# Patient Record
Sex: FEMALE | Race: WHITE | NOT HISPANIC OR LATINO | Employment: FULL TIME | URBAN - METROPOLITAN AREA
[De-identification: names, ages, dates, MRNs, and addresses within clinical notes are randomized per-mention and may not be internally consistent; named-entity substitution may affect disease eponyms.]

---

## 2017-07-28 ENCOUNTER — HOSPITAL ENCOUNTER (OUTPATIENT)
Dept: RADIOLOGY | Facility: MEDICAL CENTER | Age: 52
End: 2017-07-28
Attending: OBSTETRICS & GYNECOLOGY
Payer: COMMERCIAL

## 2017-07-28 DIAGNOSIS — Z12.31 SCREENING MAMMOGRAM, ENCOUNTER FOR: ICD-10-CM

## 2017-07-28 PROCEDURE — G0202 SCR MAMMO BI INCL CAD: HCPCS

## 2018-08-14 ENCOUNTER — HOSPITAL ENCOUNTER (OUTPATIENT)
Dept: RADIOLOGY | Facility: MEDICAL CENTER | Age: 53
End: 2018-08-14
Attending: OBSTETRICS & GYNECOLOGY
Payer: COMMERCIAL

## 2018-08-14 DIAGNOSIS — Z12.31 BREAST CANCER SCREENING BY MAMMOGRAM: ICD-10-CM

## 2018-08-14 PROCEDURE — 77067 SCR MAMMO BI INCL CAD: CPT

## 2018-08-21 ENCOUNTER — HOSPITAL ENCOUNTER (OUTPATIENT)
Dept: RADIOLOGY | Facility: MEDICAL CENTER | Age: 53
End: 2018-08-21
Attending: OBSTETRICS & GYNECOLOGY
Payer: COMMERCIAL

## 2018-08-21 DIAGNOSIS — R92.8 ABNORMAL MAMMOGRAM: ICD-10-CM

## 2018-08-21 PROCEDURE — 77065 DX MAMMO INCL CAD UNI: CPT | Mod: RT

## 2018-08-21 PROCEDURE — 76642 ULTRASOUND BREAST LIMITED: CPT | Mod: RT

## 2019-08-16 ENCOUNTER — HOSPITAL ENCOUNTER (OUTPATIENT)
Dept: RADIOLOGY | Facility: MEDICAL CENTER | Age: 54
End: 2019-08-16
Attending: OBSTETRICS & GYNECOLOGY
Payer: COMMERCIAL

## 2019-08-16 DIAGNOSIS — Z12.31 SCREENING MAMMOGRAM, ENCOUNTER FOR: ICD-10-CM

## 2019-08-16 PROCEDURE — 77063 BREAST TOMOSYNTHESIS BI: CPT

## 2020-09-02 ENCOUNTER — HOSPITAL ENCOUNTER (OUTPATIENT)
Dept: RADIOLOGY | Facility: MEDICAL CENTER | Age: 55
End: 2020-09-02
Attending: OBSTETRICS & GYNECOLOGY
Payer: COMMERCIAL

## 2020-09-02 DIAGNOSIS — Z12.31 VISIT FOR SCREENING MAMMOGRAM: ICD-10-CM

## 2020-09-02 PROCEDURE — 77067 SCR MAMMO BI INCL CAD: CPT

## 2020-09-10 ENCOUNTER — HOSPITAL ENCOUNTER (OUTPATIENT)
Dept: RADIOLOGY | Facility: MEDICAL CENTER | Age: 55
End: 2020-09-10
Attending: UROLOGY
Payer: COMMERCIAL

## 2020-09-10 ENCOUNTER — HOSPITAL ENCOUNTER (OUTPATIENT)
Facility: MEDICAL CENTER | Age: 55
End: 2020-09-10
Attending: UROLOGY
Payer: COMMERCIAL

## 2020-09-10 DIAGNOSIS — N20.0 KIDNEY STONES: ICD-10-CM

## 2020-09-10 LAB
CALCIUM SERPL-MCNC: 10 MG/DL (ref 8.5–10.5)
PTH-INTACT SERPL-MCNC: 60.7 PG/ML (ref 14–72)

## 2020-09-10 PROCEDURE — 74176 CT ABD & PELVIS W/O CONTRAST: CPT

## 2020-09-10 PROCEDURE — 83970 ASSAY OF PARATHORMONE: CPT

## 2020-09-10 PROCEDURE — 82365 CALCULUS SPECTROSCOPY: CPT

## 2020-09-14 LAB
APPEARANCE STONE: NORMAL
COMPN STONE: NORMAL
NUMBER STONE: 3
SIZE STONE: NORMAL MM
SPECIMEN WT: 2 MG

## 2021-09-17 ENCOUNTER — HOSPITAL ENCOUNTER (OUTPATIENT)
Dept: RADIOLOGY | Facility: MEDICAL CENTER | Age: 56
End: 2021-09-17
Attending: FAMILY MEDICINE
Payer: COMMERCIAL

## 2021-09-17 DIAGNOSIS — Z12.31 VISIT FOR SCREENING MAMMOGRAM: ICD-10-CM

## 2021-09-17 PROCEDURE — 77063 BREAST TOMOSYNTHESIS BI: CPT

## 2022-09-27 ENCOUNTER — HOSPITAL ENCOUNTER (OUTPATIENT)
Dept: RADIOLOGY | Facility: MEDICAL CENTER | Age: 57
End: 2022-09-27
Attending: FAMILY MEDICINE
Payer: COMMERCIAL

## 2022-09-27 DIAGNOSIS — Z12.31 VISIT FOR SCREENING MAMMOGRAM: ICD-10-CM

## 2022-09-27 PROCEDURE — 77063 BREAST TOMOSYNTHESIS BI: CPT

## 2022-10-25 ENCOUNTER — OFFICE VISIT (OUTPATIENT)
Dept: CARDIOLOGY | Facility: MEDICAL CENTER | Age: 57
End: 2022-10-25
Payer: COMMERCIAL

## 2022-10-25 ENCOUNTER — HOSPITAL ENCOUNTER (OUTPATIENT)
Dept: LAB | Facility: MEDICAL CENTER | Age: 57
End: 2022-10-25
Attending: INTERNAL MEDICINE
Payer: COMMERCIAL

## 2022-10-25 VITALS
DIASTOLIC BLOOD PRESSURE: 82 MMHG | WEIGHT: 172 LBS | SYSTOLIC BLOOD PRESSURE: 168 MMHG | HEIGHT: 62 IN | RESPIRATION RATE: 16 BRPM | BODY MASS INDEX: 31.65 KG/M2 | HEART RATE: 46 BPM | OXYGEN SATURATION: 99 %

## 2022-10-25 DIAGNOSIS — Z71.89 COUNSELING ON HEALTH PROMOTION AND DISEASE PREVENTION: ICD-10-CM

## 2022-10-25 DIAGNOSIS — R03.0 ELEVATED BLOOD PRESSURE READING: ICD-10-CM

## 2022-10-25 DIAGNOSIS — R00.1 SINUS BRADYCARDIA: Primary | ICD-10-CM

## 2022-10-25 LAB
CHOLEST SERPL-MCNC: 263 MG/DL (ref 100–199)
EKG IMPRESSION: NORMAL
FASTING STATUS PATIENT QL REPORTED: NORMAL
HDLC SERPL-MCNC: 88 MG/DL
LDLC SERPL CALC-MCNC: 154 MG/DL
TRIGL SERPL-MCNC: 107 MG/DL (ref 0–149)

## 2022-10-25 PROCEDURE — 80061 LIPID PANEL: CPT

## 2022-10-25 PROCEDURE — 93000 ELECTROCARDIOGRAM COMPLETE: CPT | Performed by: INTERNAL MEDICINE

## 2022-10-25 PROCEDURE — 99203 OFFICE O/P NEW LOW 30 MIN: CPT | Performed by: INTERNAL MEDICINE

## 2022-10-25 PROCEDURE — 36415 COLL VENOUS BLD VENIPUNCTURE: CPT

## 2022-10-25 RX ORDER — IXEKIZUMAB 80 MG/ML
80 INJECTION, SOLUTION SUBCUTANEOUS
COMMUNITY
Start: 2022-09-28

## 2022-10-25 RX ORDER — NIRMATRELVIR AND RITONAVIR 300-100 MG
KIT ORAL
COMMUNITY
Start: 2022-09-06 | End: 2022-10-25

## 2022-10-25 RX ORDER — MELOXICAM 15 MG/1
TABLET ORAL
COMMUNITY
Start: 2022-10-10

## 2022-10-25 NOTE — PATIENT INSTRUCTIONS
Lipid profile ordered, blood test    Checking Blood Pressure:  -Blood pressure cuff, spend in the $40-65, with good return policy. [employer] is a good brand  -It should be automatic, upper arm, measure your arm to get the correct size, probably adult Large but your arm should be under 16.5-17 cm. If you need an XL cuff, you will have to have it special ordered from a pharmacy or durable medical equipment company.  -Put the cuff in place, rest arm on table near height of your heart, sit quietly for 5 min, legs uncrossed, with back support, then take your blood pressure, write it down, keep a log  -Check your blood pressure in the morning and evening, and send me your log in 1 week, either through [facility] or by calling our office.  -Can bring your cuff to at least one appointment where it can be calibrated to a manual cuff if you are concerned.  -Goal blood pressure is under 120/80.  If you think your BP is overall too high, let us know in the office, we can adjust medications, can use [facility] or call the [place] office: [phone].    Salt=sodium=sea salt, guidelines say stay under 2,500 mg daily  Get salt smart, start looking at labels, count it up.  Salt is hidden in everything, salad dressing, sauces, cheese, most canned food, any processed meat.

## 2022-10-25 NOTE — PROGRESS NOTES
CARDIOLOGY NEW PATIENT:    PCP: Lorena Lopez M.D.    1. Sinus bradycardia    2. Counseling on health promotion and disease prevention    3. Elevated blood pressure reading        Kiana Roper is here for bradycardia.    Chief Complaint   Patient presents with    Bradycardia         History: Kiana Roper is a 57 y.o. female with psoriatic arthritis, inflammatory polyarthropathy on immunosuppressive medication Taltz q4 weeks, parathyroid adenoma status post removal, nephrolithiasis, carpal tunnel syndrome, osteopenia, history of COVID September 2022 is referred to cardiology due to ECG showing bradycardia on 10/10/2022 -  ECG done at a [facility] clinic (image below).      Reports knowing of slow heart rate forever, and she takes clindamycin pre dental work for a heart murmur, no known congenital heart disease.    3 months ago she gave a blood transfusion, and afterwards she had a possible syncopal episode without much prodrome. Does not recall if they checked her HR or BP then. Never had this before with prior blood donations.    Stays active, desk job, walks stairs and occasional walking. No exercise routine. No limitations with these activities.    Does not sleep well, chronically, denies LH, dizziness, palpitations, SOB, ZULLY, orthopnea, PND, caldication. No Known HAYDEE.    Normal TSH 10/20/2022 at 1.61    No recent lipid panel or A1c available for my review.    No BP machine at home. Willing to check her BP at home. Always noticed high BP at clinic visits.    Pertinent family History:  Mother and brother with slow heart rate    Social:  Coordinator for childcare resources, state funded [employer]  Lives with BF  Has 1 daughter , 1 son  1 grandchild  2 cats  Never smoked  Alcohol: about 5 drinks a week  No marijuana use  No illicit drugs      ECG 10/10/22: Personally reviewed  Sinus bradycardia, heart rate 44 bpm, no concerning ischemic changes.  Normal NE, QRS and Qtc.        PE:  BP (!) 168/82 (BP  "Location: Left arm, Patient Position: Sitting, BP Cuff Size: Adult)   Pulse (!) 46   Resp 16   Ht 1.575 m (5' 2\")   Wt 78 kg (172 lb)   SpO2 99%   BMI 31.46 kg/m²     Gen: well  HEENT: Symmetric face. Anicteric sclerae. Moist mucus membranes  NECK: No JVD. No lymphadenopathy  CARDIAC: Regular, Normal S1, S2, No murmur  VASCULATURE: carotids are normal bilaterally without bruit  RESP: Clear to auscultation bilaterally  ABD: Soft, non-tender, non-distended  EXT: No edema, no clubbing or cyanosis  SKIN: Warm and dry  NEURO: No gross deficits  PSYCH: Appropriate affect, participates in conversation    The ASCVD Risk score (Ariel DK, et al., 2019) failed to calculate.    Past Medical History:   Diagnosis Date    Pain     shoulder pain     Past Surgical History:   Procedure Laterality Date    SHOULDER DECOMPRESSION ARTHROSCOPIC  2012    Performed by Kevin Baker M.D. at SURGERY [facility]    CLAVICLE DISTAL EXCISION  2012    Performed by Kevin Baker M.D. at SURGERY [facility]    MT FRAGMENT KIDNEY STONE/ [facility]      PARATHYROIDECTOMY      tumor removed also    MT  DELIVERY ONLY  ,      Allergies   Allergen Reactions    Amoxicillin      Outpatient Encounter Medications as of 10/25/2022   Medication Sig Dispense Refill    Ixekizumab (TALTZ) 80 MG/ML Solution Auto-injector Inject 80 mg under the skin.      meloxicam (MOBIC) 15 MG tablet take 1 Tablet (15 mg total) by mouth daily with dinner      BIOTIN PO Take  by mouth.      ALBUTEROL INH Inhale.      [DISCONTINUED] PAXLOVID, 300/100, 20 x 150 MG & 10 x 100MG Tablet Therapy Pack take 3 Tablets by mouth 2 times a day for 5 days. (Patient not taking: Reported on 10/25/2022)      [DISCONTINUED] zolpidem (AMBIEN) 10 MG TABS Take 10 mg by mouth at bedtime as needed.         No facility-administered encounter medications on file as of 10/25/2022.     Social History     Socioeconomic History    Marital status: Legally "      Spouse name: Not on file    Number of children: Not on file    Years of education: Not on file    Highest education level: Not on file   Occupational History    Not on file   Tobacco Use    Smoking status: Never    Smokeless tobacco: Never   Substance and Sexual Activity    Alcohol use: Yes     Comment: 5 per week    Drug use: No    Sexual activity: Not on file   Other Topics Concern    Not on file   Social History Narrative    Not on file     Social Determinants of Health     Financial Resource Strain: Not on file   Food Insecurity: Not on file   Transportation Needs: Not on file   Physical Activity: Not on file   Stress: Not on file   Social Connections: Not on file   Intimate Partner Violence: Not on file   Housing Stability: Not on file     Family History   Problem Relation Age of Onset    Arthritis Brother     Diabetes Maternal Grandfather          Studies    No results found for: TSHULTRASEN   No results found for: FREET4   No results found for: HBA1C  No results found for: CHOLSTRLTOT, LDL, HDL, TRIGLYCERIDE    Lab Results   Component Value Date/Time    SODIUM 140 05/01/2020 09:13 AM    SODIUM 140 12/03/2012 02:04 PM    POTASSIUM 4.4 05/01/2020 09:13 AM    POTASSIUM 3.6 12/03/2012 02:04 PM    CHLORIDE 99 05/01/2020 09:13 AM    CHLORIDE 103 12/03/2012 02:04 PM    CO2 26 05/01/2020 09:13 AM    CO2 28 12/03/2012 02:04 PM    GLUCOSE 98 05/01/2020 09:13 AM    GLUCOSE 104 (H) 12/03/2012 02:04 PM    BUN 13 05/01/2020 09:13 AM    BUN 11 12/03/2012 02:04 PM    CREATININE 0.79 05/01/2020 09:13 AM    CREATININE 0.60 12/03/2012 02:04 PM    CREATININE 0.7 08/21/2007 12:33 PM    BUNCREATRAT 16 05/01/2020 09:13 AM     Lab Results   Component Value Date/Time    ALKPHOSPHAT 79 05/01/2020 09:13 AM    ALKPHOSPHAT 59 08/21/2007 12:33 PM    ASTSGOT 22 05/01/2020 09:13 AM    ASTSGOT 21 08/21/2007 12:33 PM    ALTSGPT 25 05/01/2020 09:13 AM    ALTSGPT 20 08/21/2007 12:33 PM    TBILIRUBIN 0.3 05/01/2020 09:13 AM     TBILIRUBIN 0.7 08/21/2007 12:33 PM        Echocardiogram:  No results found for this or any previous visit.    ECG: 10/25/22 personally reviewed and interpreted  Interpretive Statements   Sinus bradycardia / arrhythmia HR 46bpm   Compared to ECG 12/03/2012 15:10:50   No significant changes   Electronically Signed On 10- 13:16:17 PDT by Erick Rivera MD     Assessment and Recommendations:    Problem List Items Addressed This Visit    None  Visit Diagnoses       Sinus bradycardia    -  Primary    Relevant Orders    EKG (Completed)    Counseling on health promotion and disease prevention        Relevant Orders    Lipid Profile    Elevated blood pressure reading        Relevant Orders    Lipid Profile          Overall it seems that Ms. Roper is doing well from a cardiovascular standpoint with most likely asymptomatic sinus bradycardia.  Her episode of possible syncope after blood transfusion about 3 months ago is probably related to orthostatic hypotension.    I discussed with her the option of placing an implantable loop recorder to ensure no correlation between any potential future syncopal events and any bradycardia arrhythmias, however we agreed to continue with watchful waiting.    In regards to her elevated blood pressure reading today, I gave her instructions on what blood pressure machine to purchase and help to check her blood pressure at home and to send me an a.m. and p.m. blood pressure log in 1 week before we decide on treating hypertension.  Will eventually defer to PCP.    I gave her a lab slip to obtain a fasting lipid panel (she only drank 1 cup of coffee today with no creamer or sugar, she will try to get it done at [facility] today) to finalize recommendations regarding statin therapy for primary ASCVD prevention.    We discussed at length the importance of participating in more aerobic exercise and eating a heart healthy diet including low-salt diet.    In regards to her reported history of cardiac  murmur and taking clindamycin before dental work, on exam I do not hear any murmurs, no known history of congenital heart disease.  No known heart procedures.  I advised her that she does not need endocarditis prophylaxis medication before dental work based on the available history and physical exam.    Thank you for the opportunity to be involved in Kiana Roper 's care; and please reach out with any questions or concerns.    Return if symptoms worsen or fail to improve.    Erick Rivera MD, MPH [facility]  Interventional Cardiologist  [facility]   of Clinical Internal Medicine - [facility]    ~ Portions of this note were completed using voice recognition software (Dragon Naturally speaking software) . Occasional transcription errors may have escaped proof reading. I have made every reasonable attempt to correct obvious errors, but I expect that there are errors of grammar and possibly content that I did not discover before finalizing the note. ~

## 2022-11-02 DIAGNOSIS — I10 ESSENTIAL HYPERTENSION, BENIGN: ICD-10-CM

## 2022-11-02 RX ORDER — LISINOPRIL 5 MG/1
5 TABLET ORAL DAILY
Qty: 90 TABLET | Refills: 3 | Status: SHIPPED | OUTPATIENT
Start: 2022-11-02

## 2023-04-07 ENCOUNTER — APPOINTMENT (OUTPATIENT)
Dept: RADIOLOGY | Facility: MEDICAL CENTER | Age: 58
End: 2023-04-07
Attending: INTERNAL MEDICINE
Payer: COMMERCIAL

## 2023-04-17 ENCOUNTER — APPOINTMENT (OUTPATIENT)
Dept: RADIOLOGY | Facility: MEDICAL CENTER | Age: 58
End: 2023-04-17
Attending: INTERNAL MEDICINE
Payer: COMMERCIAL

## 2023-04-17 DIAGNOSIS — M79.641 RIGHT HAND PAIN: ICD-10-CM

## 2023-04-17 DIAGNOSIS — M79.642 LEFT HAND PAIN: ICD-10-CM

## 2023-04-17 DIAGNOSIS — M54.12 CERVICAL RADICULOPATHY: ICD-10-CM

## 2023-04-17 PROCEDURE — 73221 MRI JOINT UPR EXTREM W/O DYE: CPT | Mod: RT

## 2023-04-17 PROCEDURE — 72141 MRI NECK SPINE W/O DYE: CPT

## 2023-04-17 PROCEDURE — 73218 MRI UPPER EXTREMITY W/O DYE: CPT | Mod: RT

## 2023-04-18 ENCOUNTER — OFFICE VISIT (OUTPATIENT)
Dept: PHYSICAL MEDICINE AND REHAB | Facility: MEDICAL CENTER | Age: 58
End: 2023-04-18
Payer: COMMERCIAL

## 2023-04-18 VITALS
SYSTOLIC BLOOD PRESSURE: 148 MMHG | HEART RATE: 54 BPM | HEIGHT: 62 IN | TEMPERATURE: 97.1 F | OXYGEN SATURATION: 99 % | WEIGHT: 171.52 LBS | DIASTOLIC BLOOD PRESSURE: 92 MMHG | BODY MASS INDEX: 31.56 KG/M2

## 2023-04-18 DIAGNOSIS — Z13.31 POSITIVE DEPRESSION SCREENING: ICD-10-CM

## 2023-04-18 DIAGNOSIS — R20.0 NUMBNESS AND TINGLING OF RIGHT ARM: ICD-10-CM

## 2023-04-18 DIAGNOSIS — Z91.81 RISK FOR FALLS: ICD-10-CM

## 2023-04-18 DIAGNOSIS — M47.816 LUMBAR SPONDYLOSIS: ICD-10-CM

## 2023-04-18 DIAGNOSIS — R20.2 NUMBNESS AND TINGLING OF RIGHT ARM: ICD-10-CM

## 2023-04-18 DIAGNOSIS — R20.0 NUMBNESS AND TINGLING IN LEFT ARM: ICD-10-CM

## 2023-04-18 DIAGNOSIS — G89.29 CHRONIC BILATERAL LOW BACK PAIN WITHOUT SCIATICA: ICD-10-CM

## 2023-04-18 DIAGNOSIS — R20.2 NUMBNESS AND TINGLING IN LEFT ARM: ICD-10-CM

## 2023-04-18 DIAGNOSIS — M79.10 MYALGIA: ICD-10-CM

## 2023-04-18 DIAGNOSIS — M54.2 CERVICALGIA: ICD-10-CM

## 2023-04-18 DIAGNOSIS — M54.50 CHRONIC BILATERAL LOW BACK PAIN WITHOUT SCIATICA: ICD-10-CM

## 2023-04-18 PROCEDURE — 99204 OFFICE O/P NEW MOD 45 MIN: CPT | Performed by: STUDENT IN AN ORGANIZED HEALTH CARE EDUCATION/TRAINING PROGRAM

## 2023-04-18 ASSESSMENT — PATIENT HEALTH QUESTIONNAIRE - PHQ9
SUM OF ALL RESPONSES TO PHQ QUESTIONS 1-9: 11
CLINICAL INTERPRETATION OF PHQ2 SCORE: 2
5. POOR APPETITE OR OVEREATING: 1 - SEVERAL DAYS

## 2023-04-18 ASSESSMENT — PAIN SCALES - GENERAL: PAINLEVEL: 7=MODERATE-SEVERE PAIN

## 2023-04-18 NOTE — PROGRESS NOTES
"New Patient Note    Interventional Pain and Spine  Physiatry (Physical Medicine and Rehabilitation)     Patient Name: Kiana Roper  : 1965  Date of Service: 2023  PCP: Lorena Lopez M.D.  Referring Provider: Ana Luisa Emerson M.D.    Chief Complaint:   Chief Complaint   Patient presents with    New Patient     DJD (degenerative joint disease)       HPI  HISTORY (2023):  Kiana Roper is a 57 y.o. female who presents today with neck pain and back pain. She was referred by [facility] for cervical DJD with no improvement after PT and NSAIDs. Sees rheum for psoriatic arthritis. She had to discontinue her latest rheum med due to side effect of rash and is concerned that her pain will worsen in the meantime.     Regarding neck pain: She notes 1 year history of pain that feels like someone is \"pushing down on her shoulders\" and her entire arms go numb and tingly. This worsens with overexertion and improves with resting her head backwards on something.  Worsening, getting more frequent. Does PT and yoga and home exercise program. Notes remote hx of carpal tunnel syndrome on her right and is now s/p carpal tunnel release. Also was diagnosed with mild left carpal tunnel syndrome which has improved. Notes general weakness but denies focal weakness.    Regarding low back pain: back pain that feels like \"chronic kidney stones\" and internal tension. Notes her urine has been checked and was WNL.  Pain improves with placing pressure on her back and using a heating pad. Notes occasional muscle spasms in her back.  Notes history of leg tremor.    Pain right now is 7/10 on the numeric pain scale. Her pain at its best-worse level during the course of the day is typically 5-7/10, respectively.  Pain worsens with sitting, standing, walking, bending forward, bending backwards, side bending or twisting, walking upstairs, walking downstairs, coughing, and sneezing and improves with laying down. " Her pain interferes somewhat with ADLs.     The patient has done physical therapy for her neck pain but not back pain.    Patient has tried the following medications with varied success:  Mobic  Aleve    Therapeutic modalities and interventional therapies to date include:  -no injections    Medical history includes anxiety, CHF, psoriatic arthritis followed by [place] [facility]      Psychological testing for pain as depression and pain commonly coexist and need to both be treated.     Opioid Risk Score: 0      Interpretation of Opioid Risk Score   Score 0-3 = Low risk of abuse. Do UDS at least once per year.  Score 4-7 = Moderate risk of abuse. Do UDS 1-4 times per year.  Score 8+ = High risk of abuse. Refer to specialist.    PHQ      2023     3:00 PM   Depression Screen (PHQ-2/PHQ-9)   PHQ-2 Total Score 2   PHQ-9 Total Score 11       Interpretation of PHQ-9 Total Score   Score Severity   1-4 No Depression   5-9 Mild Depression   10-14 Moderate Depression   15-19 Moderately Severe Depression   20-27 Severe Depression      Medical records review:  I reviewed the note from the referring provider Ana Luisa Emerson M.D. including the note dated 3/23/23.    ROS:   Red Flags ROS:   Fever, Chills, Sweats: Denies  Involuntary Weight Loss: Denies  Bladder Incontinence: Denies  Bowel Incontinence: denies  Saddle Anesthesia: Denies    All other systems reviewed and negative.     PMHx:   Past Medical History:   Diagnosis Date    Pain     shoulder pain       PSHx:   Past Surgical History:   Procedure Laterality Date    SHOULDER DECOMPRESSION ARTHROSCOPIC  2012    Performed by Kevin Baker M.D. at [facility]S    CLAVICLE DISTAL EXCISION  2012    Performed by Kevin Baker M.D. at [facility] OR[facility]    [place] FRAGMENT KIDNEY STONE/ ESWL  2007    PARATHYROIDECTOMY  2007    tumor removed also    [place]  DELIVERY ONLY  ,        Family Hx:   Family History   Problem Relation Age of  Onset    Arthritis Brother     Diabetes Maternal Grandfather        Social Hx:  Social History     Socioeconomic History    Marital status: Legally      Spouse name: Not on file    Number of children: Not on file    Years of education: Not on file    Highest education level: Not on file   Occupational History    Not on file   Tobacco Use    Smoking status: Never    Smokeless tobacco: Never   Substance and Sexual Activity    Alcohol use: Yes     Comment: 5 per week    Drug use: No    Sexual activity: Not on file   Other Topics Concern    Not on file   Social History Narrative    Not on file     Social Determinants of Health     Financial Resource Strain: Not on file   Food Insecurity: Not on file   Transportation Needs: Not on file   Physical Activity: Not on file   Stress: Not on file   Social Connections: Not on file   Intimate Partner Violence: Not on file   Housing Stability: Not on file       Allergies:  Allergies   Allergen Reactions    Amoxicillin        Medications: reviewed on epic.   Outpatient Medications Marked as Taking for the 4/18/23 encounter (Office Visit) with Cami Harkins M.D.   Medication Sig Dispense Refill    sertraline (ZOLOFT) 50 MG Tab Take 50 mg by mouth every day.      BIOTIN PO Take  by mouth.      ALBUTEROL INH Inhale.          Current Outpatient Medications on File Prior to Visit   Medication Sig Dispense Refill    sertraline (ZOLOFT) 50 MG Tab Take 50 mg by mouth every day.      BIOTIN PO Take  by mouth.      ALBUTEROL INH Inhale.      lisinopril (PRINIVIL) 5 MG Tab Take 1 Tablet by mouth every day. (Patient not taking: Reported on 4/18/2023) 90 Tablet 3    Ixekizumab (TALTZ) 80 MG/ML Solution Auto-injector Inject 80 mg under the skin. (Patient not taking: Reported on 4/18/2023)      meloxicam (MOBIC) 15 MG tablet take 1 Tablet (15 mg total) by mouth daily with dinner (Patient not taking: Reported on 4/18/2023)       No current facility-administered medications on file prior  "to visit.         EXAMINATION     Physical Exam:   BP (!) 148/92 (BP Location: Right arm, Patient Position: Sitting, BP Cuff Size: Adult)   Pulse (!) 54   Temp 36.2 °C (97.1 °F) (Temporal)   Ht 1.575 m (5' 2\")   Wt 77.8 kg (171 lb 8.3 oz)   SpO2 99%     Constitutional:   Body Habitus: Body mass index is 31.37 kg/m².  Cooperation: Fully cooperates with exam  Appearance: Well-groomed, well-nourished.    Eyes: No scleral icterus to suggest severe liver disease, no proptosis to suggest severe hyperthyroidism    ENT -no obvious auditory deficits, no noticeable facial droop     Skin -no rashes or lesions noted     Respiratory-  breathing comfortably on room air, no audible wheezing    Cardiovascular-distal extremities warm and well perfused.  No lower extremity edema is noted.     Gastrointestinal - no obvious abdominal masses, non-distended    Psychiatric- alert and oriented ×3. Normal affect.     Gait - normal gait, no use of ambulatory device, nonantalgic. Heel walking and toe walking intact.    Musculoskeletal and Neuro -     Cervical spine   Inspection: No deformities of the skin over the cervical spine. No rashes or lesions.    full active range of motion in all directions    Spurling's sign  negative bilaterally  Cervical facet loading maneuver  negative bilaterally    No signs of muscular atrophy in bilateral upper extremities     Tenderness to palpation at upper trapezius bilaterally. No tenderness to palpation elsewhere including midline of cervical spine, paracervical muscles bilaterally, and cervical facets bilaterally.      Key points for the international standards for neurological classification of spinal cord injury (ISNCSCI) to light touch.     Dermatome R L   C4 2 2   C5 2 2   C6 2 2   C7 2 2   C8 2 2   T1 2 2   T2 2 2       Motor Exam Upper Extremities   ? Myotome R L   Shoulder abduction C5 5 5   Elbow flexion C5 5 5   Wrist extension C6 5 5   Elbow extension C7 5 5   Finger flexion C8 5 5 "   Finger abduction T1 5 5     Reflexes  ?  R L   Biceps  3+ 3+   Brachioradialis  3+ 3+     Novoa's sign positive bilaterally -symmetric       Thoracic/Lumbar Spine/Sacral Spine/Hips   Inspection: No evidence of atrophy in bilateral lower extremities throughout     There is full active range of motion with lumbar extension    Facet loading maneuver positive bilaterally    Palpation:   Tenderness to palpation over the lumbar facets bilaterally spanning approximately L1-L5 levels.    Lumbar spine /hip provocative exam maneuvers  Straight leg raise negative bilaterally  FADIR test negative bilaterally    SI joint tests  SPIKE test negative bilaterally  Thigh thrust test negative bilaterally    Key points for the international standards for neurological classification of spinal cord injury (ISNCSCI) to light touch.   Dermatome R L   L2 2 2   L3 2 2   L4 2 2   L5 2 2   S1 2 2   S2 2 2       Motor Exam Lower Extremities  ? Myotome R L   Hip flexion L2 5 5   Knee extension L3 5 5   Ankle dorsiflexion L4 5 5   Toe extension L5 5 5   Ankle plantarflexion S1 5 5       Reflexes  ?  R L   Patella  3+ 3+   Achilles   3+ 3+     Clonus of the ankle negative bilaterally       MEDICAL DECISION MAKING    Medical records review: see under HPI section.     DATA    Labs: Personally reviewed at today's visit:     Lab Results   Component Value Date/Time    SODIUM 140 05/01/2020 09:13 AM    SODIUM 140 12/03/2012 02:04 PM    POTASSIUM 4.4 05/01/2020 09:13 AM    POTASSIUM 3.6 12/03/2012 02:04 PM    CHLORIDE 99 05/01/2020 09:13 AM    CHLORIDE 103 12/03/2012 02:04 PM    CO2 26 05/01/2020 09:13 AM    CO2 28 12/03/2012 02:04 PM    ANION 9.0 12/03/2012 02:04 PM    GLUCOSE 98 05/01/2020 09:13 AM    GLUCOSE 104 (H) 12/03/2012 02:04 PM    BUN 13 05/01/2020 09:13 AM    BUN 11 12/03/2012 02:04 PM    CREATININE 0.79 05/01/2020 09:13 AM    CREATININE 0.60 12/03/2012 02:04 PM    CREATININE 0.7 08/21/2007 12:33 PM    CALCIUM 10.0 09/10/2020 02:50 PM     ASTSGOT 22 05/01/2020 09:13 AM    ASTSGOT 21 08/21/2007 12:33 PM    ALTSGPT 25 05/01/2020 09:13 AM    ALTSGPT 20 08/21/2007 12:33 PM    TBILIRUBIN 0.3 05/01/2020 09:13 AM    TBILIRUBIN 0.7 08/21/2007 12:33 PM    ALBUMIN 4.8 05/01/2020 09:13 AM    ALBUMIN 4.2 08/21/2007 12:33 PM    TOTPROTEIN 8.0 05/01/2020 09:13 AM    TOTPROTEIN 7.3 08/21/2007 12:33 PM    GLOBULIN 3.1 08/21/2007 12:33 PM    AGRATIO 1.5 05/01/2020 09:13 AM    AGRATIO 1.4 08/21/2007 12:33 PM       Lab Results   Component Value Date/Time    PROTHROMBTM 11.6 06/12/2007 05:10 PM    INR 0.92 06/12/2007 05:10 PM        Lab Results   Component Value Date/Time    WBC 9.4 12/03/2012 02:04 PM    RBC 4.66 12/03/2012 02:04 PM    HEMOGLOBIN 14.3 12/03/2012 02:04 PM    HEMATOCRIT 42.1 12/03/2012 02:04 PM    MCV 90.4 12/03/2012 02:04 PM    MCH 30.8 12/03/2012 02:04 PM    MCHC 34.0 12/03/2012 02:04 PM    MPV 7.6 12/03/2012 02:04 PM    NEUTSPOLYS 69.3 08/21/2007 12:33 PM    LYMPHOCYTES 23.0 08/21/2007 12:33 PM    MONOCYTES 5.2 08/21/2007 12:33 PM    EOSINOPHILS 2.1 08/21/2007 12:33 PM    BASOPHILS 0.3 08/21/2007 12:33 PM        No results found for: HBA1C     Imaging:   I personally reviewed following images, these are my reads  MRI cervical spine 4/17/2023  Disc bulge most notable at C4-5 and C5-6 with moderate to severe bilateral neuroforaminal stenosis at each of these levels.  Mild bilateral neuroforaminal stenosis at C6-7.  See formal radiology report for further details.          IMAGING radiology reads. I reviewed the following radiology reads   X-ray lumbar spine 10/10/2022    FINDINGS:  AP, lateral x 2 views demonstrate 5 nonrib-bearing lumbar   vertebral segments with curvature apex to the left at L3. no fracture or   osseous lesion.  Degerative disc disease is as follow, with severity of   disc space narrowing and any associated other findings by level:     T12-L1: mild to moderate   L1-L2: minimal   L2-L3: minimal   L3-L4: minimal   L4-L5: minmal    L5-S1:mild to moderate, mild to moderate facet disease.     SI joints are unremarkable.     IMPRESSION: Degenerative disc disease.      MRI cervical spine 4/17/2023                               FINDINGS:  The cervical spine maintains normal height and alignment. There is no fracture or dislocation. There is no pathologic marrow infiltration. There is no focal osseous lesion.     At the level of C2-3,there is no spinal or neural foraminal stenosis.     At the level of C3-4,there is no spinal or neural foraminal stenosis.     At the level of C4-5,there is disc degeneration. There is mild diffuse disc bulge. Bilateral uncinate joint arthropathy is seen. There is mild effacement of the ventral subarachnoid space. There is moderate to severe bilateral neural foraminal stenosis.     At the level of C5-6,there is disc degeneration. There is mild diffuse disc bulge. There is mild effacement of the ventral subarachnoid space. Bilateral uncinate joint arthropathy is seen. There is moderate to severe bilateral neural foraminal stenosis.     At the level of C6-7,there is disc degeneration. There is small central disc protrusion. There is focal effacement of the ventral subarachnoid space. There is mild bilateral neural foraminal stenosis.     At the level of C7-T1,there is no spinal or neural foraminal stenosis.     The visualized posterior fossa structures appear normal within limits.  The cervical spinal cord does not demonstrate any mass or abnormal T2 signal intensity.     The visualized pre-and paraspinal soft tissues appear normal within limits.     IMPRESSION:     Multifocal degenerative disease in the cervical spine as described above.    Diagnosis  Visit Diagnoses     ICD-10-CM   1. Numbness and tingling in left arm  R20.0    R20.2   2. Numbness and tingling of right arm  R20.0    R20.2   3. Cervicalgia  M54.2   4. Myalgia  M79.10   5. Chronic bilateral low back pain without sciatica  M54.50    G89.29   6. Lumbar  spondylosis  M47.816   7. Positive depression screening  Z13.31   8. Risk for falls  Z91.81         ASSESSMENT AND PLAN:  Kiana Roper ( 1965) is a female presenting with pain primarily at her bilateral upper trapezius with negative Spurling's and negative facet loading maneuver.  She endorses circumferential numbness and tingling in her bilateral arms that improves with cervical extension which does not sound consistent with the areas of cervical neuroforaminal stenosis seen on her MRI cervical spine 2023.  At this time it is unclear what the primary reason for her numbness and tingling in her arms are.  She does endorse history of bilateral carpal tunnel syndrome, with carpal tunnel release on the right side that improved her symptoms at that time.  Notes that her current symptoms feel different from carpal tunnel syndrome.    Also with bilateral low back pain axial non radiating for which she has not done physical therapy in the past.       Kiana was seen today for new patient.    Diagnoses and all orders for this visit:    Numbness and tingling in left arm  -     Referral to Neurodiagnostics (EEG,EP,EMG/NCS/DBS)    Numbness and tingling of right arm  -     Referral to Neurodiagnostics (EEG,EP,EMG/NCS/DBS)    Cervicalgia    Myalgia    Chronic bilateral low back pain without sciatica  -     Referral to Physical Therapy    Lumbar spondylosis  -     Referral to Physical Therapy    Positive depression screening    Risk for falls  -     Patient identified as fall risk.  Appropriate orders and counseling given.          PLAN  Physical Therapy: I ordered physical therapy to focus on strengthening and stretching as well as a home exercise program.  Advised her to continue her home exercise program for her upper back    Diagnostic workup: Personally reviewed at today's visit: MRI cervical spine 2023        Medications:   -Okay to continue Mobic or Aleve as needed    Interventions:   - Trigger  point injections under ultrasound guidance. The risks, benefits, and alternatives to this procedure were discussed. Risks include but are not limited to damage to surrounding structures, infection, bleeding, worsening of pain which can be permanent, and collapsed lung. Benefits include pain relief and improved function. Alternatives include not doing the procedure.  The patient wishes to avoid this procedure for now but will contact me if she elects to proceed with this in the future    Referral:  - To neurodiagnostics for EMG    Follow-up: After EMG    Orders Placed This Encounter    Referral to Physical Therapy    Referral to Neurodiagnostics (EEG,EP,EMG/NCS/DBS)    Patient identified as fall risk.  Appropriate orders and counseling given.    sertraline (ZOLOFT) 50 MG Tab       Cami Harkins MD  Interventional Pain and Spine  Physical Medicine and Rehabilitation  [facility]    CC Ana Luisa Emerson M.D.     The above note documents my personal evaluation of this patient. In addition, I have reviewed and confirmed with the patient and MA the supportive information documented in today's Patient Health Questionnaire and Office Note.     Please note that this dictation was created using voice recognition software. I have made every reasonable attempt to correct obvious errors, but I expect that there are errors of grammar and possibly content that I did not discover before finalizing the note.

## 2023-05-10 ENCOUNTER — PATIENT MESSAGE (OUTPATIENT)
Dept: PHYSICAL MEDICINE AND REHAB | Facility: MEDICAL CENTER | Age: 58
End: 2023-05-10
Payer: COMMERCIAL

## 2023-05-10 NOTE — TELEPHONE ENCOUNTER
Based on Loreto's note on the referral request, I think radiology might still be processing the referral. Please doublecheck the status of the referral request in her chart and let her know how to proceed with scheduling it.

## 2023-05-19 ENCOUNTER — NON-PROVIDER VISIT (OUTPATIENT)
Dept: NEUROLOGY | Facility: MEDICAL CENTER | Age: 58
End: 2023-05-19
Attending: SPECIALIST
Payer: COMMERCIAL

## 2023-05-19 DIAGNOSIS — R20.0 NUMBNESS AND TINGLING OF RIGHT ARM: ICD-10-CM

## 2023-05-19 DIAGNOSIS — R20.2 NUMBNESS AND TINGLING OF RIGHT ARM: ICD-10-CM

## 2023-05-19 DIAGNOSIS — R20.2 NUMBNESS AND TINGLING IN LEFT ARM: ICD-10-CM

## 2023-05-19 DIAGNOSIS — R20.0 NUMBNESS AND TINGLING IN LEFT ARM: ICD-10-CM

## 2023-05-19 PROCEDURE — 95910 NRV CNDJ TEST 7-8 STUDIES: CPT | Performed by: SPECIALIST

## 2023-05-19 PROCEDURE — 95886 MUSC TEST DONE W/N TEST COMP: CPT | Mod: 26 | Performed by: SPECIALIST

## 2023-05-19 PROCEDURE — 95910 NRV CNDJ TEST 7-8 STUDIES: CPT | Mod: 26 | Performed by: SPECIALIST

## 2023-05-19 PROCEDURE — 95886 MUSC TEST DONE W/N TEST COMP: CPT | Performed by: SPECIALIST

## 2023-05-24 ENCOUNTER — OFFICE VISIT (OUTPATIENT)
Dept: PHYSICAL MEDICINE AND REHAB | Facility: MEDICAL CENTER | Age: 58
End: 2023-05-24
Payer: COMMERCIAL

## 2023-05-24 VITALS
HEART RATE: 46 BPM | WEIGHT: 171.74 LBS | DIASTOLIC BLOOD PRESSURE: 90 MMHG | OXYGEN SATURATION: 97 % | SYSTOLIC BLOOD PRESSURE: 140 MMHG | BODY MASS INDEX: 31.6 KG/M2 | TEMPERATURE: 97.2 F | HEIGHT: 62 IN

## 2023-05-24 DIAGNOSIS — R20.2 NUMBNESS AND TINGLING IN LEFT ARM: ICD-10-CM

## 2023-05-24 DIAGNOSIS — M54.50 CHRONIC BILATERAL LOW BACK PAIN WITHOUT SCIATICA: ICD-10-CM

## 2023-05-24 DIAGNOSIS — R20.0 NUMBNESS AND TINGLING OF RIGHT ARM: ICD-10-CM

## 2023-05-24 DIAGNOSIS — R20.2 NUMBNESS AND TINGLING OF RIGHT ARM: ICD-10-CM

## 2023-05-24 DIAGNOSIS — M79.10 MYALGIA: ICD-10-CM

## 2023-05-24 DIAGNOSIS — M47.816 LUMBAR SPONDYLOSIS: ICD-10-CM

## 2023-05-24 DIAGNOSIS — G89.29 CHRONIC BILATERAL LOW BACK PAIN WITHOUT SCIATICA: ICD-10-CM

## 2023-05-24 DIAGNOSIS — G56.02 CARPAL TUNNEL SYNDROME OF LEFT WRIST: Primary | ICD-10-CM

## 2023-05-24 DIAGNOSIS — M54.2 CERVICALGIA: ICD-10-CM

## 2023-05-24 DIAGNOSIS — R20.0 NUMBNESS AND TINGLING IN LEFT ARM: ICD-10-CM

## 2023-05-24 PROCEDURE — 3077F SYST BP >= 140 MM HG: CPT | Performed by: STUDENT IN AN ORGANIZED HEALTH CARE EDUCATION/TRAINING PROGRAM

## 2023-05-24 PROCEDURE — 99213 OFFICE O/P EST LOW 20 MIN: CPT | Performed by: STUDENT IN AN ORGANIZED HEALTH CARE EDUCATION/TRAINING PROGRAM

## 2023-05-24 PROCEDURE — 3080F DIAST BP >= 90 MM HG: CPT | Performed by: STUDENT IN AN ORGANIZED HEALTH CARE EDUCATION/TRAINING PROGRAM

## 2023-05-24 PROCEDURE — 1125F AMNT PAIN NOTED PAIN PRSNT: CPT | Performed by: STUDENT IN AN ORGANIZED HEALTH CARE EDUCATION/TRAINING PROGRAM

## 2023-05-24 RX ORDER — CELECOXIB 200 MG/1
200 CAPSULE ORAL
COMMUNITY
Start: 2023-05-09 | End: 2024-05-08

## 2023-05-24 ASSESSMENT — PATIENT HEALTH QUESTIONNAIRE - PHQ9: CLINICAL INTERPRETATION OF PHQ2 SCORE: 0

## 2023-05-24 ASSESSMENT — PAIN SCALES - GENERAL: PAINLEVEL: 5=MODERATE PAIN

## 2023-05-24 NOTE — PROGRESS NOTES
"Follow-up patient Note    Interventional Pain and Spine  Physiatry (Physical Medicine and Rehabilitation)     Patient Name: Kiana Roper  : 1965  Date of service: 2023    Chief Complaint:   Chief Complaint   Patient presents with    Follow-Up     Numbness and tingling in left arm       HISTORY (2023):  Kiana Roper is a 57 y.o. female who presents today with neck pain and back pain. She was referred by [place] [facility] for cervical DJD with no improvement after PT and NSAIDs. Sees rheum for psoriatic arthritis. She had to discontinue her latest rheum med due to side effect of rash and is concerned that her pain will worsen in the meantime.      Regarding neck pain: She notes 1 year history of pain that feels like someone is \"pushing down on her shoulders\" and her entire arms go numb and tingly. This worsens with overexertion and improves with resting her head backwards on something.  Worsening, getting more frequent. Does PT and yoga and home exercise program. Notes remote hx of carpal tunnel syndrome on her right and is now s/p carpal tunnel release. Also was diagnosed with mild left carpal tunnel syndrome which has improved. Notes general weakness but denies focal weakness.     Regarding low back pain: back pain that feels like \"chronic kidney stones\" and internal tension. Notes her urine has been checked and was WNL.  Pain improves with placing pressure on her back and using a heating pad. Notes occasional muscle spasms in her back.  Notes history of leg tremor.     Pain right now is 7/10 on the numeric pain scale. Her pain at its best-worse level during the course of the day is typically 5-7/10, respectively.  Pain worsens with sitting, standing, walking, bending forward, bending backwards, side bending or twisting, walking upstairs, walking downstairs, coughing, and sneezing and improves with laying down. Her pain interferes somewhat with ADLs.      The patient has done " physical therapy for her neck pain but not back pain.     Patient has tried the following medications with varied success:  Mobic  Aleve     Therapeutic modalities and interventional therapies to date include:  -no injections     Medical history includes anxiety, CHF, psoriatic arthritis followed by [facility] [facility]    HPI  Today's visit   Kiana Roper ( 1965) is a female with The primary encounter diagnosis was Carpal tunnel syndrome of left wrist. Diagnoses of Numbness and tingling in left arm, Numbness and tingling of right arm, Cervicalgia, Chronic bilateral low back pain without sciatica, Lumbar spondylosis, and Myalgia were also pertinent to this visit.    Presents for follow-up after EMG 2023 which showed evidence of mild left CTS. She has ongoing sensation of numbness and tingling in her entire left arm worse with driving and when she wakes up.  Denies radiating pain in arms.  She has not tried hand therapy for this or wrist splints on the left before.  She does have history of right carpal tunnel release.    Ongoing pain in her neck, mainly upper trapezius bilaterally, and axial bilateral low back which does not radiate.  Started PT, has had 5 sessions. Had improvement with traction.      Pain severity /10 currently      ROS:   Red Flags ROS:   Fever, Chills, Sweats: Denies  Involuntary Weight Loss: Denies  Bladder Incontinence: Denies  Bowel Incontinence: denies  Saddle Anesthesia: Denies    All other systems reviewed and negative.     PMHx:   Past Medical History:   Diagnosis Date    Pain     shoulder pain       PSHx:   Past Surgical History:   Procedure Laterality Date    SHOULDER DECOMPRESSION ARTHROSCOPIC  2012    Performed by Kevin Baker M.D. at SURGERY [facility]    CLAVICLE DISTAL EXCISION  2012    Performed by Kevin Baker M.D. at SURGERY [facility]    [place] FRAGMENT KIDNEY STONE/ ESWL  2007    PARATHYROIDECTOMY  2007    [place]  1993       Family Hx:   Family History   Problem Relation Age of Onset    Arthritis Brother     Diabetes Maternal Grandfather        Social Hx:  Social History     Socioeconomic History    Marital status: Legally      Spouse name: Not on file    Number of children: Not on file    Years of education: Not on file    Highest education level: Not on file   Occupational History    Not on file   Tobacco Use    Smoking status: Never    Smokeless tobacco: Never   Vaping Use    Vaping Use: Never used   Substance and Sexual Activity    Alcohol use: Yes     Comment: 5 per week    Drug use: No    Sexual activity: Not on file   Other Topics Concern    Not on file   Social History Narrative    Not on file     Social Determinants of Health     Financial Resource Strain: Not on file   Food Insecurity: Not on file   Transportation Needs: Not on file   Physical Activity: Not on file   Stress: Not on file   Social Connections: Not on file   Intimate Partner Violence: Not on file   Housing Stability: Not on file       Allergies:  Allergies   Allergen Reactions    Amoxicillin        Medications: reviewed on epic.   Outpatient Medications Marked as Taking for the 23 encounter (Office Visit) with Cami Harkins M.D.   Medication Sig Dispense Refill    celecoxib (CELEBREX) 200 MG Cap Take 200 mg by mouth.      sertraline (ZOLOFT) 50 MG Tab Take 50 mg by mouth every day.      BIOTIN PO Take  by mouth.      ALBUTEROL INH Inhale.          Current Outpatient Medications on File Prior to Visit   Medication Sig Dispense Refill    celecoxib (CELEBREX) 200 MG Cap Take 200 mg by mouth.      sertraline (ZOLOFT) 50 MG Tab Take 50 mg by mouth every day.      BIOTIN PO Take  by mouth.      ALBUTEROL INH Inhale.      lisinopril (PRINIVIL) 5 MG Tab Take 1 Tablet by mouth every day. (Patient not taking: Reported on 2023) 90 Tablet 3    Ixekizumab (TALTZ) 80 MG/ML Solution Auto-injector Inject 80 mg under  "the skin. (Patient not taking: Reported on 4/18/2023)      meloxicam (MOBIC) 15 MG tablet take 1 Tablet (15 mg total) by mouth daily with dinner (Patient not taking: Reported on 4/18/2023)       No current facility-administered medications on file prior to visit.         EXAMINATION     Physical Exam:   BP (!) 140/90 (BP Location: Right arm, Patient Position: Sitting, BP Cuff Size: Adult)   Pulse (!) 46   Temp 36.2 °C (97.2 °F) (Temporal)   Ht 1.575 m (5' 2\")   Wt 77.9 kg (171 lb 11.8 oz)   SpO2 97%     Constitutional:   Body Habitus: Body mass index is 31.41 kg/m².  Cooperation: Fully cooperates with exam  Appearance: Well-groomed, well-nourished.    Eyes: No scleral icterus to suggest severe liver disease, no proptosis to suggest severe hyperthyroidism    ENT -no obvious auditory deficits, no noticeable facial droop     Skin -no rashes or lesions noted     Respiratory-  breathing comfortably on room air, no audible wheezing    Cardiovascular-distal extremities warm and well perfused.  No lower extremity edema is noted.     Gastrointestinal - no obvious abdominal masses, non-distended    Psychiatric- alert and oriented ×3. Normal affect.     Gait - normal gait, no use of ambulatory device, nonantalgic.     Musculoskeletal and Neuro -      Cervical spine   Inspection: No deformities of the skin over the cervical spine. No rashes or lesions.      Spurling's sign  negative bilaterally     No signs of muscular atrophy in bilateral upper extremities      Tenderness to palpation at upper trapezius bilaterally. No tenderness to palpation elsewhere including midline of cervical spine, paracervical muscles bilaterally, and cervical facets bilaterally.        Key points for the international standards for neurological classification of spinal cord injury (ISNCSCI) to light touch.      Dermatome R L   C4 2 2   C5 2 2   C6 2 2   C7 2 2   C8 2 2   T1 2 2   T2 2 2         Motor Exam Upper Extremities   ? Myotome R L "   Shoulder abduction C5 5 5   Elbow flexion C5 5 5   Wrist extension C6 5 5   Elbow extension C7 5 5   Finger flexion C8 5 5   Finger abduction T1 5 5          Thoracic/Lumbar Spine/Sacral Spine/Hips   Inspection: No evidence of atrophy in bilateral lower extremities throughout      There is full active range of motion with lumbar extension     Facet loading maneuver positive bilaterally     Palpation:   Tenderness to palpation over the lumbar facets bilaterally spanning approximately L1-L5 levels.     Lumbar spine /hip provocative exam maneuvers  Straight leg raise negative bilaterally  FADIR test negative bilaterally     SI joint tests  SPIKE test negative bilaterally  Thigh thrust test negative bilaterally     Key points for the international standards for neurological classification of spinal cord injury (ISNCSCI) to light touch.   Dermatome R L   L2 2 2   L3 2 2   L4 2 2   L5 2 2   S1 2 2   S2 2 2         Motor Exam Lower Extremities  ? Myotome R L   Hip flexion L2 5 5   Knee extension L3 5 5   Ankle dorsiflexion L4 5 5   Toe extension L5 5 5   Ankle plantarflexion S1 5 5         Previous exam    full active range of motion in all directions    Cervical facet loading maneuver  negative bilaterally    Reflexes  ?   R L   Biceps   3+ 3+   Brachioradialis   3+ 3+      Novoa's sign positive bilaterally -symmetric    Reflexes  ?   R L   Patella   3+ 3+   Achilles    3+ 3+      Clonus of the ankle negative bilaterally       MEDICAL DECISION MAKING    Medical records review: see under HPI section.     DATA    Labs: No new labs available for review since last visit.   Lab Results   Component Value Date/Time    SODIUM 140 05/01/2020 09:13 AM    SODIUM 140 12/03/2012 02:04 PM    POTASSIUM 4.4 05/01/2020 09:13 AM    POTASSIUM 3.6 12/03/2012 02:04 PM    CHLORIDE 99 05/01/2020 09:13 AM    CHLORIDE 103 12/03/2012 02:04 PM    CO2 26 05/01/2020 09:13 AM    CO2 28 12/03/2012 02:04 PM    ANION 9.0 12/03/2012 02:04 PM     GLUCOSE 98 05/01/2020 09:13 AM    GLUCOSE 104 (H) 12/03/2012 02:04 PM    BUN 13 05/01/2020 09:13 AM    BUN 11 12/03/2012 02:04 PM    CREATININE 0.79 05/01/2020 09:13 AM    CREATININE 0.60 12/03/2012 02:04 PM    CREATININE 0.7 08/21/2007 12:33 PM    CALCIUM 10.0 09/10/2020 02:50 PM    ASTSGOT 22 05/01/2020 09:13 AM    ASTSGOT 21 08/21/2007 12:33 PM    ALTSGPT 25 05/01/2020 09:13 AM    ALTSGPT 20 08/21/2007 12:33 PM    TBILIRUBIN 0.3 05/01/2020 09:13 AM    TBILIRUBIN 0.7 08/21/2007 12:33 PM    ALBUMIN 4.8 05/01/2020 09:13 AM    ALBUMIN 4.2 08/21/2007 12:33 PM    TOTPROTEIN 8.0 05/01/2020 09:13 AM    TOTPROTEIN 7.3 08/21/2007 12:33 PM    GLOBULIN 3.1 08/21/2007 12:33 PM    AGRATIO 1.5 05/01/2020 09:13 AM    AGRATIO 1.4 08/21/2007 12:33 PM       Lab Results   Component Value Date/Time    PROTHROMBTM 11.6 06/12/2007 05:10 PM    INR 0.92 06/12/2007 05:10 PM        Lab Results   Component Value Date/Time    WBC 9.4 12/03/2012 02:04 PM    RBC 4.66 12/03/2012 02:04 PM    HEMOGLOBIN 14.3 12/03/2012 02:04 PM    HEMATOCRIT 42.1 12/03/2012 02:04 PM    MCV 90.4 12/03/2012 02:04 PM    MCH 30.8 12/03/2012 02:04 PM    MCHC 34.0 12/03/2012 02:04 PM    MPV 7.6 12/03/2012 02:04 PM    NEUTSPOLYS 69.3 08/21/2007 12:33 PM    LYMPHOCYTES 23.0 08/21/2007 12:33 PM    MONOCYTES 5.2 08/21/2007 12:33 PM    EOSINOPHILS 2.1 08/21/2007 12:33 PM    BASOPHILS 0.3 08/21/2007 12:33 PM        No results found for: HBA1C     EMG 5/19/2023 by Dr. Dobson  DIAGNOSTIC INTERPRETATION:   Extensive electrodiagnostic studies were performed to the bilateral upper extremities.  The results are as follows:     1.  Left carpal tunnel syndrome which is mild electrophysiologically and not associated with motor unit changes in the left median nerve supplied hand muscles.     2.  Normal left ulnar nerve motor and sensory conduction studies.     3.  No evidence of radiculopathy in selected muscles studied left upper extremity.     4.  Normal EMG and nerve conduction  study right upper extremity.  Specifically right median and ulnar motor and sensory nerve conduction studies were within normal limits and there were no acute or chronic denervation changes in selected muscles studied in the right upper extremity.    Imaging:   I personally reviewed following images, these are my reads  MRI cervical spine 4/17/2023  Disc bulge most notable at C4-5 and C5-6 with moderate to severe bilateral neuroforaminal stenosis at each of these levels.  Mild bilateral neuroforaminal stenosis at C6-7.  See formal radiology report for further details.              IMAGING radiology reads. I reviewed the following radiology reads   X-ray lumbar spine 10/10/2022    FINDINGS:  AP, lateral x 2 views demonstrate 5 nonrib-bearing lumbar   vertebral segments with curvature apex to the left at L3. no fracture or   osseous lesion.  Degerative disc disease is as follow, with severity of   disc space narrowing and any associated other findings by level:     T12-L1: mild to moderate   L1-L2: minimal   L2-L3: minimal   L3-L4: minimal   L4-L5: minmal   L5-S1:mild to moderate, mild to moderate facet disease.     SI joints are unremarkable.     IMPRESSION: Degenerative disc disease.       MRI cervical spine 4/17/2023                               FINDINGS:  The cervical spine maintains normal height and alignment. There is no fracture or dislocation. There is no pathologic marrow infiltration. There is no focal osseous lesion.     At the level of C2-3,there is no spinal or neural foraminal stenosis.     At the level of C3-4,there is no spinal or neural foraminal stenosis.     At the level of C4-5,there is disc degeneration. There is mild diffuse disc bulge. Bilateral uncinate joint arthropathy is seen. There is mild effacement of the ventral subarachnoid space. There is moderate to severe bilateral neural foraminal stenosis.     At the level of C5-6,there is disc degeneration. There is mild diffuse disc bulge.  There is mild effacement of the ventral subarachnoid space. Bilateral uncinate joint arthropathy is seen. There is moderate to severe bilateral neural foraminal stenosis.     At the level of C6-7,there is disc degeneration. There is small central disc protrusion. There is focal effacement of the ventral subarachnoid space. There is mild bilateral neural foraminal stenosis.     At the level of C7-T1,there is no spinal or neural foraminal stenosis.     The visualized posterior fossa structures appear normal within limits.  The cervical spinal cord does not demonstrate any mass or abnormal T2 signal intensity.     The visualized pre-and paraspinal soft tissues appear normal within limits.     IMPRESSION:     Multifocal degenerative disease in the cervical spine as described above.                                                     Diagnosis  Visit Diagnoses     ICD-10-CM   1. Carpal tunnel syndrome of left wrist  G56.02   2. Numbness and tingling in left arm  R20.0    R20.2   3. Numbness and tingling of right arm  R20.0    R20.2   4. Cervicalgia  M54.2   5. Chronic bilateral low back pain without sciatica  M54.50    G89.29   6. Lumbar spondylosis  M47.816   7. Myalgia  M79.10         ASSESSMENT AND PLAN:  Kiana Roper (: 1965) is a female       Kiana was seen today for follow-up.    Diagnoses and all orders for this visit:    Carpal tunnel syndrome of left wrist  -     Referral to Physical Therapy    Numbness and tingling in left arm  -     Referral to Physical Therapy    Numbness and tingling of right arm  -     Referral to Physical Therapy    Cervicalgia  -     Referral to Physical Therapy    Chronic bilateral low back pain without sciatica  -     Referral to Physical Therapy    Lumbar spondylosis  -     Referral to Physical Therapy    Myalgia  -     Referral to Physical Therapy          PLAN  Physical Therapy: Refresh physical therapy referral today to also include numbness tingling in her arms and  left carpal tunnel syndrome      Diagnostic workup: Again personally reviewed at today's visit: MRI cervical spine 4/17/2023         Medications:   -Okay to continue Mobic or Aleve as needed     Interventions:   -Discussed trigger point injections under ultrasound guidance which she would like to defer at this time in favor of doing more physical therapy first which I think is reasonable. The risks, benefits, and alternatives to this procedure were discussed. Risks include but are not limited to damage to surrounding structures, infection, bleeding, worsening of pain which can be permanent, and collapsed lung. Benefits include pain relief and improved function. Alternatives include not doing the procedure.       Other  - discussed that I recommend wearing a left wrist at night for carpal tunnel syndrome    Follow-up: 6 months or sooner as needed    Orders Placed This Encounter    Referral to Physical Therapy    celecoxib (CELEBREX) 200 MG Cap       Cami Harkins MD  Interventional Pain and Spine  Physical Medicine and Rehabilitation  [facility]      The above note documents my personal evaluation of this patient. In addition, I have reviewed and confirmed with the patient and MA the supportive information documented in today's Patient Health Questionnaire and Office Note.     Please note that this dictation was created using voice recognition software. I have made every reasonable attempt to correct obvious errors, but I expect that there are errors of grammar and possibly content that I did not discover before finalizing the note.

## 2023-11-16 ENCOUNTER — APPOINTMENT (OUTPATIENT)
Dept: PHYSICAL MEDICINE AND REHAB | Facility: MEDICAL CENTER | Age: 58
End: 2023-11-16
Payer: COMMERCIAL